# Patient Record
Sex: MALE | RURAL
[De-identification: names, ages, dates, MRNs, and addresses within clinical notes are randomized per-mention and may not be internally consistent; named-entity substitution may affect disease eponyms.]

---

## 2022-08-01 ENCOUNTER — APPOINTMENT (OUTPATIENT)
Dept: RURAL CLINIC 11 | Age: 27
Setting detail: DERMATOLOGY
End: 2022-08-01

## 2022-08-01 DIAGNOSIS — L0391 CELLULITIS AND ABSCESS OF UNSPECIFIED SITES: ICD-10-CM

## 2022-08-01 DIAGNOSIS — L0390 CELLULITIS AND ABSCESS OF UNSPECIFIED SITES: ICD-10-CM

## 2022-08-01 PROBLEM — L02.416 CUTANEOUS ABSCESS OF LEFT LOWER LIMB: Status: ACTIVE | Noted: 2022-08-01

## 2022-08-01 PROCEDURE — OTHER INTRALESIONAL KENALOG: OTHER

## 2022-08-01 PROCEDURE — OTHER MIPS QUALITY: OTHER

## 2022-08-01 PROCEDURE — OTHER COUNSELING: OTHER

## 2022-08-01 PROCEDURE — OTHER PRESCRIPTION: OTHER

## 2022-08-01 PROCEDURE — OTHER ADDITIONAL NOTES: OTHER

## 2022-08-01 PROCEDURE — OTHER OTHER (SELF PAY): OTHER

## 2022-08-01 RX ORDER — SULFAMETHOXAZOLE AND TRIMETHOPRIM 800; 160 MG/1; MG/1
TABLET ORAL BID
Qty: 20 | Refills: 0 | Status: ERX | COMMUNITY
Start: 2022-08-01

## 2022-08-01 RX ORDER — CLINDAMYCIN PHOSPHATE 10 MG/ML
SOLUTION TOPICAL BID
Qty: 60 | Refills: 3 | Status: ERX | COMMUNITY
Start: 2022-08-01

## 2022-08-01 ASSESSMENT — LOCATION DETAILED DESCRIPTION DERM: LOCATION DETAILED: LEFT ANTERIOR PROXIMAL THIGH

## 2022-08-01 ASSESSMENT — LOCATION ZONE DERM: LOCATION ZONE: LEG

## 2022-08-01 ASSESSMENT — LOCATION SIMPLE DESCRIPTION DERM: LOCATION SIMPLE: LEFT THIGH

## 2022-08-01 NOTE — PROCEDURE: INTRALESIONAL KENALOG
How Many Mls Were Removed From The 80 Mg/Ml (5ml) Vial When Preparing The Injectable Solution?: 0
Validate Note Data When Using Inventory: Yes
Medical Necessity Clause: This procedure was medically necessary because the lesions that were treated were:
Detail Level: Simple
Kenalog Preparation: Kenalog
Include Z78.9 (Other Specified Conditions Influencing Health Status) As An Associated Diagnosis?: No
Concentration Of Solution Injected (Mg/Ml): 3.0
Consent: The risks of atrophy were reviewed with the patient.
Total Volume Injected (Ccs- Only Use Numbers And Decimals): 1.0

## 2022-08-01 NOTE — PROCEDURE: MIPS QUALITY
Quality 431: Preventive Care And Screening: Unhealthy Alcohol Use - Screening: Patient not identified as an unhealthy alcohol user when screened for unhealthy alcohol use using a systematic screening method
Detail Level: Detailed
Quality 130: Documentation Of Current Medications In The Medical Record: Current Medications Documented
Quality 226: Preventive Care And Screening: Tobacco Use: Screening And Cessation Intervention: Patient screened for tobacco use, is a smoker AND received Cessation Counseling within the Previous 12 Months
Quality 110: Preventive Care And Screening: Influenza Immunization: Influenza Immunization Ordered or Recommended, but not Administered due to system reason

## 2022-08-01 NOTE — PROCEDURE: ADDITIONAL NOTES
Additional Notes: Has isolated lesion \\n\\nDiscussed HS in general
Render Risk Assessment In Note?: no
Detail Level: Simple

## 2022-08-18 ENCOUNTER — APPOINTMENT (OUTPATIENT)
Dept: URBAN - NONMETROPOLITAN AREA CLINIC 55 | Age: 27
Setting detail: DERMATOLOGY
End: 2022-08-18

## 2022-08-18 DIAGNOSIS — L81.0 POSTINFLAMMATORY HYPERPIGMENTATION: ICD-10-CM

## 2022-08-18 DIAGNOSIS — L73.2 HIDRADENITIS SUPPURATIVA: ICD-10-CM

## 2022-08-18 PROCEDURE — OTHER MIPS QUALITY: OTHER

## 2022-08-18 PROCEDURE — OTHER MEDICATION COUNSELING: OTHER

## 2022-08-18 PROCEDURE — OTHER COUNSELING: OTHER

## 2022-08-18 PROCEDURE — OTHER OTHER (SELF PAY): OTHER

## 2022-08-18 PROCEDURE — OTHER PATIENT SPECIFIC COUNSELING: OTHER

## 2022-08-18 PROCEDURE — 99211 OFF/OP EST MAY X REQ PHY/QHP: CPT

## 2022-08-18 PROCEDURE — OTHER PRESCRIPTION: OTHER

## 2022-08-18 RX ORDER — DOXYCYCLINE HYCLATE 100 MG/1
CAPSULE, GELATIN COATED ORAL
Qty: 10 | Refills: 4 | Status: ERX | COMMUNITY
Start: 2022-08-18

## 2022-08-18 ASSESSMENT — LOCATION DETAILED DESCRIPTION DERM
LOCATION DETAILED: LEFT MEDIAL MALAR CHEEK
LOCATION DETAILED: LEFT ANTERIOR MEDIAL PROXIMAL THIGH

## 2022-08-18 ASSESSMENT — LOCATION ZONE DERM
LOCATION ZONE: FACE
LOCATION ZONE: LEG

## 2022-08-18 ASSESSMENT — LOCATION SIMPLE DESCRIPTION DERM
LOCATION SIMPLE: LEFT CHEEK
LOCATION SIMPLE: LEFT THIGH

## 2022-08-18 NOTE — PROCEDURE: MEDICATION COUNSELING
Xelolegarioz Pregnancy And Lactation Text: This medication is Pregnancy Category D and is not considered safe during pregnancy.  The risk during breast feeding is also uncertain.

## 2022-08-18 NOTE — PROCEDURE: OTHER (SELF PAY)
Price (Use Numbers Only, No Special Characters Or $): 36.98
Detail Level: Zone
Price (Use Numbers Only, No Special Characters Or $): 0

## 2022-08-18 NOTE — PROCEDURE: PATIENT SPECIFIC COUNSELING
Continue with clindamycin gel\\nRecommended Full bath of warm water, one capful of bleach(pool water) once weekly\\nRecommended benzoyl peroxide wash(sample)\\nRecommended not gain weight \\nRecommended stop smoking, can use nicotine patch or gum as you try to quit\\nUse sunscreen\\nEat one yogurt daily while taking doxycycline
Detail Level: Detailed
Patient states had a blister here, we will re-examine at f/u. Only see pIH today

## 2022-12-06 ENCOUNTER — APPOINTMENT (OUTPATIENT)
Dept: URBAN - NONMETROPOLITAN AREA CLINIC 55 | Age: 27
Setting detail: DERMATOLOGY
End: 2022-12-07

## 2022-12-06 DIAGNOSIS — L73.2 HIDRADENITIS SUPPURATIVA: ICD-10-CM

## 2022-12-06 PROCEDURE — OTHER PATIENT SPECIFIC COUNSELING: OTHER

## 2022-12-06 PROCEDURE — 11900 INJECT SKIN LESIONS </W 7: CPT

## 2022-12-06 PROCEDURE — OTHER MEDICATION COUNSELING: OTHER

## 2022-12-06 PROCEDURE — OTHER INTRALESIONAL KENALOG: OTHER

## 2022-12-06 PROCEDURE — OTHER OTHER (SELF PAY): OTHER

## 2022-12-06 PROCEDURE — OTHER PRESCRIPTION: OTHER

## 2022-12-06 PROCEDURE — OTHER MIPS QUALITY: OTHER

## 2022-12-06 PROCEDURE — OTHER COUNSELING: OTHER

## 2022-12-06 RX ORDER — DOXYCYCLINE HYCLATE 100 MG/1
CAPSULE, GELATIN COATED ORAL
Qty: 10 | Refills: 4 | Status: ERX

## 2022-12-06 ASSESSMENT — LOCATION ZONE DERM: LOCATION ZONE: LEG

## 2022-12-06 ASSESSMENT — LOCATION DETAILED DESCRIPTION DERM: LOCATION DETAILED: LEFT ANTERIOR MEDIAL PROXIMAL THIGH

## 2022-12-06 ASSESSMENT — LOCATION SIMPLE DESCRIPTION DERM: LOCATION SIMPLE: LEFT THIGH

## 2022-12-06 NOTE — PROCEDURE: PATIENT SPECIFIC COUNSELING
0.1 Lidocaine 1% was added to 0.1 Kenalog 10 = 0.2 cc
Detail Level: Detailed
Continue benzoyl peroxide wash\\nRecommended stop smoking, can use nicotine patch or gum as you try to quit\\nUse sunscreen\\n*Eat one yogurt daily while taking doxycycline\\n*Recommended 2 probiotic gummy daily\\nContinue with dial soap\\nContinue bleach bath once weekly\\nPatient requested that I inject w intralesional kenalog. [place] so I will go ahead and inject today so as not to inconvenience him w another trip. Explained Kenalog can make hypopigmented scar and/or indent\\nDiscussed chronic nature of HS

## 2022-12-06 NOTE — PROCEDURE: MEDICATION COUNSELING
BX MEME #1   Qbrexza Counseling:  I discussed with the patient the risks of Qbrexza including but not limited to headache, mydriasis, blurred vision, dry eyes, nasal dryness, dry mouth, dry throat, dry skin, urinary hesitation, and constipation.  Local skin reactions including erythema, burning, stinging, and itching can also occur.